# Patient Record
Sex: MALE | Race: WHITE | NOT HISPANIC OR LATINO | URBAN - METROPOLITAN AREA
[De-identification: names, ages, dates, MRNs, and addresses within clinical notes are randomized per-mention and may not be internally consistent; named-entity substitution may affect disease eponyms.]

---

## 2018-03-11 ENCOUNTER — EMERGENCY (EMERGENCY)
Facility: HOSPITAL | Age: 28
LOS: 0 days | Discharge: HOME | End: 2018-03-11
Attending: EMERGENCY MEDICINE

## 2018-03-11 VITALS
RESPIRATION RATE: 18 BRPM | HEART RATE: 67 BPM | DIASTOLIC BLOOD PRESSURE: 51 MMHG | SYSTOLIC BLOOD PRESSURE: 114 MMHG | OXYGEN SATURATION: 100 %

## 2018-03-11 VITALS
TEMPERATURE: 98 F | DIASTOLIC BLOOD PRESSURE: 58 MMHG | RESPIRATION RATE: 18 BRPM | HEART RATE: 88 BPM | OXYGEN SATURATION: 100 % | SYSTOLIC BLOOD PRESSURE: 123 MMHG

## 2018-03-11 DIAGNOSIS — R11.2 NAUSEA WITH VOMITING, UNSPECIFIED: ICD-10-CM

## 2018-03-11 DIAGNOSIS — E86.0 DEHYDRATION: ICD-10-CM

## 2018-03-11 RX ORDER — ONDANSETRON 8 MG/1
4 TABLET, FILM COATED ORAL ONCE
Qty: 0 | Refills: 0 | Status: COMPLETED | OUTPATIENT
Start: 2018-03-11 | End: 2018-03-11

## 2018-03-11 RX ORDER — SODIUM CHLORIDE 9 MG/ML
2000 INJECTION INTRAMUSCULAR; INTRAVENOUS; SUBCUTANEOUS ONCE
Qty: 0 | Refills: 0 | Status: COMPLETED | OUTPATIENT
Start: 2018-03-11 | End: 2018-03-11

## 2018-03-11 RX ADMIN — SODIUM CHLORIDE 1000 MILLILITER(S): 9 INJECTION INTRAMUSCULAR; INTRAVENOUS; SUBCUTANEOUS at 20:50

## 2018-03-11 RX ADMIN — ONDANSETRON 4 MILLIGRAM(S): 8 TABLET, FILM COATED ORAL at 20:49

## 2018-03-11 NOTE — ED ADULT NURSE NOTE - OBJECTIVE STATEMENT
pt reports he has been vomiting since yesterday. c/o abdominal burning while he vomits. unable to eat or drink since vomiting began. denies diarrhea.

## 2018-03-11 NOTE — ED PROVIDER NOTE - PHYSICAL EXAMINATION
CONSTITUTIONAL: Well-developed; well-nourished; in no acute distress.   SKIN: warm, dry  HEAD: Normocephalic; atraumatic.  EYES: no conj injection  ENT: No nasal discharge; airway clear.  Dry oral mucosa.    NECK: Supple; non tender.  CARD: S1, S2 normal; no murmurs, gallops, or rubs. Regular rate and rhythm.   RESP: No wheezes, rales or rhonchi.  ABD: soft ntnd  EXT: Normal ROM.  No clubbing, cyanosis or edema.   NEURO: Alert, oriented, grossly unremarkable  PSYCH: Cooperative, appropriate.

## 2018-03-11 NOTE — ED ADULT TRIAGE NOTE - CHIEF COMPLAINT QUOTE
drank 10 beers and 10 shots last night, has been vomiting since that time. alert and in no distress.

## 2018-03-11 NOTE — ED PROVIDER NOTE - PROGRESS NOTE DETAILS
Patient improving he is tolerating po I will continue to monitor at this time Pt noted to be clinically improved.

## 2018-03-11 NOTE — ED PROVIDER NOTE - MEDICAL DECISION MAKING DETAILS
I have personally performed a history and physical exam on this patient and personally directed the management of the patient. patient presents for evaluation of vomiting, multiple episodes of nb, nb vomiting after drinking heavily the night before. He denies any abdominal pain he denies any fevers or chills he denies any diarrhea.  On physical exam he is nc/at perrla eomi oropharynx clear cta b/l, rrr s1s2 noted abd-soft nt nd bs + ext from with no edema noted. we administered IV fluids, iv zofran and obtained labs he improved here in the ED. with repeat abdominal exam normal and no complaints of any gu abnormalities.

## 2018-03-11 NOTE — ED PROVIDER NOTE - NS ED ROS FT
Gen: No fevers, chills  Eyes:  No visual changes, eye pain or discharge.  ENMT:  No hearing changes, pain. No neck pain or stiffness.  Cardiac:  No chest pain, SOB   Respiratory:  No cough or respiratory distress.  GI:  see HPI  :  No dysuria, frequency or burning.  MS:  No myalgia, muscle weakness, joint pain or back pain.  Neuro:  No headache or weakness.  No LOC.  Skin:  No skin rash.   Endocrine: No history of thyroid disease or diabetes.

## 2022-03-03 NOTE — ED ADULT NURSE NOTE - NSSISCREENINGQ5_ED_A_ED
No
[FreeTextEntry1] : \par Annual Physical\par Vaccines are UTD, Shingrix vaccine ordered- pt will get vaccine at [facility] today if available\par Mammo, Colonoscopy, Bone Density are UTD\par Last gyn/pap >5 years ago, pap not clinically indicated in this age group\par Lab work ordered\par \par HTN\par Continue ASA 81 mg QD\par Continue Carvedilol 6.25 mg BID with meals\par Continue Enalapril 5 mg QD\par Discussed & reinforced the importance of a low sodium, low fat diet and exercise. \par \par HLD\par Continue Atorvastatin 20 mg QHS\par will send lipid panel and LFTs today\par Discussed and reinforced the importance of low fat/low cholesterol diet and physical activity. \par \par DM2\par continue metformin 500 mg QD\par Discussed and reinforced the importance of following a low/reduced sugar, low carbohydrate diet and physical activity. \par will send HgbA1C today\par \par Hx Cardiomyopathy/AICD in place\par follows with cardiology Q6 months\par reinforced the importance of heart healthy diet and exercise\par \par f/u in one week for results.

## 2022-11-11 NOTE — ED ADULT NURSE NOTE - DOES PATIENT HAVE ADVANCE DIRECTIVE
[facility]NCOUNTER  URGENT CARE OFFICE VISIT    Chief Complaint   Patient presents with   • Ear Problem     Hx of RT ear pressure and pulsation over the last two days, no drainage - no hx of swimming - did have a hx of sinus congestion and drainage a couple of weeks back.        SUBJECTIVE:  Jakub Rodrigez is a 77 year old male who presented requesting evaluation for wax build-up in right ear. Denies fever, sore throat, nasal congestion cough dizziness, headache.     REVIEW OF SYSTEMS:    Constitutional: No fever, fatigue or weight loss.  Skin: No rash  ENT: Muffled audition. No congestion or sore throat  Cardiovascular: No chest pain  Respiratory: No cough, shortness of breath, or wheezing.  Gastrointestinal: No abdominal pain, nausea, vomiting, or diarrhea  Neurologic: No headache.    PAST HISTORIES:    ALLERGIES:   Allergen Reactions   • Chlorhexidine Gluconate Cloth RASH       MEDICATIONS:  Current Outpatient Medications   Medication Sig Dispense Refill   • Probiotic Product (Florajen3) capsule Take 1 capsule by mouth in the morning and 1 capsule in the evening. 30 capsule 0   • tadalafil (CIALIS) 5 MG tablet Take 2 tablets by mouth daily. 180 tablet 3   • acetaminophen (TYLENOL) 500 MG tablet Take 2 tablets by mouth every 6 hours as needed for Pain. 30 tablet 0   • zolpidem (AMBIEN) 10 MG tablet Take 10 mg by mouth nightly as needed.     • simvastatin (ZOCOR) 20 MG tablet Take 20 mg by mouth nightly.     • Multiple Vitamins-Minerals (MULTIVITAMIN PO) Take 1 tablet by mouth daily.       No current facility-administered medications for this visit.        Past Medical History:   Diagnosis Date   • Hernia, inguinal     appt. with Dr. Isidro   • High cholesterol    • Prostatitis      Past Surgical History:   Procedure Laterality Date   • Arthroscopy knee medial or lat Left    • Colonoscopy  04/15/2021   • Laparoscopy,rp ini ing hernia Left 05/26/2021    Laparoscopic preperitoneal repair of indirect  left inguinal hernia with medium lightweight 3DMax mesh     Social History     Tobacco Use   • Smoking status: Never Smoker   • Smokeless tobacco: Never Used   Vaping Use   • Vaping Use: never used   Substance Use Topics   • Alcohol use: Yes     Alcohol/week: 5.0 standard drinks     Types: 5 Cans of beer per week     Comment: occ   • Drug use: Never     Social History     Tobacco Use   Smoking Status Never Smoker   Smokeless Tobacco Never Used     Social History     Substance and Sexual Activity   Alcohol Use Yes   • Alcohol/week: 5.0 standard drinks   • Types: 5 Cans of beer per week    Comment: occ     No family history on file.    OBJECTIVE:  PHYSICAL EXAM:    Visit Vitals  BP (!) 153/69   Pulse (!) 51   Temp 97.8 °F (36.6 °C) (Temporal)   Resp 12      CONSTITUTIONAL: Well-hydrated, well-nourished male who appears to be in no acute distress.   HEENT: External ears without deformities. External auditory canal occluded by cerumen. Nose without deformities. There is moist nasal mucosa. Throat is clear with moist mucous membranes.   NECK: No lymphadenopathy or thyroid enlargement. There is full range of motion. There is no tenderness noted.  LUNGS: Clear to auscultation bilaterally. No wheezes, rales or rhonchi noted.   CARDIOVASCULAR: Regular rate and rhythm with normal S1 and S2. There are no murmurs auscultated.   ABDOMEN: Soft and non-tender. No abnormal mass. No liver or spleen enlargement. Bowel sounds normal.   SKIN: Warm, dry, intact without rash or lesion.  NEUROLOGIC: Alert and oriented   PSYCHIATRIC:  Normal mood and affect.    URGENT CARE COURSE:    right ear(s) irrigated using water, peroxide by nursing staff  External auditory canal clear after procedure. TMs are noted to be normal bilaterally.  Hearing is grossly normal after irrigation.    Patient instruction: no Q-tips.    ASSESSMENT:    1. Impacted cerumen of right ear        PLAN:    PATIENT INSTRUCTIONS:      Preventive measures discussed      FOLLOW-UP:      The patient was advised to follow up with primary physician or to recheck with the urgent care clinic sooner if symptoms get worse or if new symptoms appear.    The patient indicated understanding of the diagnosis and agreed with the plan of care.    Keon Holt MD       No

## 2023-08-31 NOTE — ED PROVIDER NOTE - NS ED ATTENDING STATEMENT MOD
I have personally seen and examined this patient.  I have fully participated in the care of this patient. I have reviewed all pertinent clinical information, including history, physical exam, plan and the Resident’s note and agree except as noted.
Known